# Patient Record
Sex: MALE | URBAN - METROPOLITAN AREA
[De-identification: names, ages, dates, MRNs, and addresses within clinical notes are randomized per-mention and may not be internally consistent; named-entity substitution may affect disease eponyms.]

---

## 2020-05-26 ENCOUNTER — APPOINTMENT (RX ONLY)
Dept: URBAN - METROPOLITAN AREA OTHER 9 | Facility: OTHER | Age: 80
Setting detail: DERMATOLOGY
End: 2020-05-26

## 2020-05-26 DIAGNOSIS — L30.0 NUMMULAR DERMATITIS: ICD-10-CM

## 2020-05-26 PROCEDURE — ? COUNSELING

## 2020-05-26 PROCEDURE — 99024 POSTOP FOLLOW-UP VISIT: CPT

## 2020-05-26 PROCEDURE — ? PATHOLOGY DISCUSSION

## 2020-05-26 PROCEDURE — ? TREATMENT REGIMEN

## 2020-05-26 ASSESSMENT — LOCATION DETAILED DESCRIPTION DERM
LOCATION DETAILED: LEFT PROXIMAL PRETIBIAL REGION
LOCATION DETAILED: RIGHT PROXIMAL PRETIBIAL REGION
LOCATION DETAILED: RIGHT DISTAL PRETIBIAL REGION
LOCATION DETAILED: RIGHT LATERAL PROXIMAL PRETIBIAL REGION

## 2020-05-26 ASSESSMENT — LOCATION SIMPLE DESCRIPTION DERM
LOCATION SIMPLE: RIGHT PRETIBIAL REGION
LOCATION SIMPLE: LEFT PRETIBIAL REGION

## 2020-05-26 ASSESSMENT — LOCATION ZONE DERM: LOCATION ZONE: LEG

## 2020-05-26 NOTE — HPI: RASH
Is The Patient Presenting As Previously Scheduled?: Yes
How Severe Is Your Rash?: moderate
Is This A New Presentation, Or A Follow-Up?: Rash
Additional History: The patient reports the first area of dermatitis appeared on his right pretibial in December 2019, then 3 new areas of dermatitis on the right pretibial appeared within the past month. He also noticed new areas of dermatitis on the left pretibial. He has been treating his bilateral lower legs with OTC Hydrocortisone, which has improved the itching. He declines a full body skin examination.

## 2020-05-26 NOTE — PROCEDURE: PATHOLOGY DISCUSSION
Detail Level: Zone
Add 42371 Cpt? (Important Note: In 2017 The Use Of 65921 Is Being Tracked By Cms To Determine Future Global Period Reimbursement For Global Periods): yes

## 2020-05-26 NOTE — PROCEDURE: TREATMENT REGIMEN
Detail Level: Detailed
Plan: The patient is to apply Bryhali to affected areas on the bilateral lower legs twice daily for 2 weeks, then as needed for flares. He is to apply Sernivo spray to affected areas as needed for itchiness.
Samples Given: Bryhali, Sernivo spray